# Patient Record
Sex: FEMALE | Race: WHITE | NOT HISPANIC OR LATINO | Employment: UNEMPLOYED | URBAN - METROPOLITAN AREA
[De-identification: names, ages, dates, MRNs, and addresses within clinical notes are randomized per-mention and may not be internally consistent; named-entity substitution may affect disease eponyms.]

---

## 2024-01-16 ENCOUNTER — HOSPITAL ENCOUNTER (EMERGENCY)
Facility: HOSPITAL | Age: 24
Discharge: HOME | End: 2024-01-16
Attending: EMERGENCY MEDICINE | Admitting: EMERGENCY MEDICINE
Payer: COMMERCIAL

## 2024-01-16 VITALS
DIASTOLIC BLOOD PRESSURE: 71 MMHG | HEART RATE: 108 BPM | RESPIRATION RATE: 18 BRPM | SYSTOLIC BLOOD PRESSURE: 128 MMHG | WEIGHT: 120.15 LBS | TEMPERATURE: 98.3 F | HEIGHT: 64 IN | BODY MASS INDEX: 20.51 KG/M2 | OXYGEN SATURATION: 94 %

## 2024-01-16 DIAGNOSIS — H18.822 CORNEAL ABRASION DUE TO CONTACT LENS, LEFT: Primary | ICD-10-CM

## 2024-01-16 PROCEDURE — 99283 EMERGENCY DEPT VISIT LOW MDM: CPT

## 2024-01-16 RX ORDER — TOBRAMYCIN 3 MG/ML
2 SOLUTION/ DROPS OPHTHALMIC ONCE
Status: COMPLETED | OUTPATIENT
Start: 2024-01-16 | End: 2024-01-16

## 2024-01-16 RX ORDER — ERYTHROMYCIN 5 MG/G
1 OINTMENT OPHTHALMIC ONCE
Status: DISCONTINUED | OUTPATIENT
Start: 2024-01-16 | End: 2024-01-16

## 2024-01-16 RX ADMIN — TOBRAMYCIN OPHTHALMIC SOLUTION 2 DROP: 3 SOLUTION/ DROPS OPHTHALMIC at 02:34

## 2024-01-16 NOTE — ED PROVIDER NOTES
"Time: 1:54 AM EST  Date of encounter:  1/16/2024  Independent Historian/Clinical History and Information was obtained by:   Patient    History is limited by: N/A    Chief Complaint: Eye problem      History of Present Illness:  Patient is a 23 y.o. year old female who presents to the emergency department for evaluation of left eye irritation and blurred vision.  Patient states her eye had been irritating her yesterday and she took out her contacts and noticed that 1 corner of it was torn so thought possibly she had scratched her eye.  She has been wearing her glasses for the last 24 hours and the sensation and irritation has continued.  Patient denies any other trauma to her eye.  No purulent discharge just watering and blurred vision is mostly when she looks out towards her ear the painful sensation is more towards her nose on her eye    HPI    Patient Care Team  Primary Care Provider: Provider, No Known    Past Medical History:     No Known Allergies  History reviewed. No pertinent past medical history.  History reviewed. No pertinent surgical history.  History reviewed. No pertinent family history.    Home Medications:  Prior to Admission medications    Not on File        Social History:          Review of Systems:  Review of Systems   Constitutional:  Negative for fever.   HENT:  Negative for sinus pressure, sinus pain, sneezing and sore throat.    Eyes:  Positive for pain (Irritated feeling), redness and visual disturbance (Blurred when she looks a certain way). Negative for photophobia.   Gastrointestinal:  Negative for vomiting.   Skin:  Negative for rash.   Neurological:  Negative for headaches.   All other systems reviewed and are negative.       Physical Exam:  /71 (BP Location: Left arm, Patient Position: Sitting)   Pulse 108   Temp 98.3 °F (36.8 °C) (Oral)   Resp 18   Ht 162.6 cm (64\")   Wt 54.5 kg (120 lb 2.4 oz)   LMP 01/09/2024   SpO2 94%   BMI 20.62 kg/m²     Physical Exam  Vitals and " nursing note reviewed.   Constitutional:       Appearance: Normal appearance.   HENT:      Head: Atraumatic.      Nose: Nose normal.      Mouth/Throat:      Mouth: Mucous membranes are moist.   Eyes:      General: Lids are normal. Lids are everted, no foreign bodies appreciated. Vision grossly intact. Gaze aligned appropriately.         Left eye: No foreign body, discharge or hordeolum.      Extraocular Movements: Extraocular movements intact.      Conjunctiva/sclera:      Right eye: Right conjunctiva is not injected. No chemosis, exudate or hemorrhage.     Left eye: Left conjunctiva is injected. No chemosis, exudate or hemorrhage.     Pupils: Pupils are equal, round, and reactive to light.      Left eye: Corneal abrasion (Tiny at the 3:00 location left cornea) and fluorescein uptake present. Shireen exam negative.     Slit lamp exam:     Right eye: Anterior chamber quiet.      Left eye: Anterior chamber quiet.      Visual Fields: Right eye visual fields normal and left eye visual fields normal.   Pulmonary:      Effort: Pulmonary effort is normal.   Musculoskeletal:         General: Normal range of motion.      Cervical back: Normal range of motion.   Skin:     General: Skin is warm and dry.   Neurological:      General: No focal deficit present.      Mental Status: She is alert.   Psychiatric:         Mood and Affect: Mood normal.         Behavior: Behavior normal.              Medical Decision Making:      Comorbidities that affect care:    None    External Notes reviewed:    None      The following orders were placed and all results were independently analyzed by me:  Orders Placed This Encounter   Procedures    Visual acuity screening       Medications Given in the Emergency Department:  Medications   tobramycin 0.3 % ophthalmic solution 2 drop (2 drops Left Eye Given 1/16/24 0234)        ED Course:         Labs:    Lab Results (last 24 hours)       ** No results found for the last 24 hours. **              Imaging:    No Radiology Exams Resulted Within Past 24 Hours      Differential Diagnosis and Discussion:    Eye Pain/Blurred Vision: Differential diagnosis includes but is not limited to dacryocystitis, hordeolum, chalazion, periorbital cellulitis, cavernous sinus thrombosis, blepharitis, and glaucoma.        MDM  Number of Diagnoses or Management Options  Corneal abrasion due to contact lens, left  Diagnosis management comments: I have explained the patient´s condition, diagnoses and treatment plan based on the information available to me at this time. I have answered questions and addressed any concerns. The patient has a good  understanding of the patient´s diagnosis, condition, and treatment plan as can be expected at this point. The vital signs have been stable. The patient´s condition is stable and appropriate for discharge from the emergency department.      The patient will pursue further outpatient evaluation with the primary care physician or other designated or consulting physician as outlined in the discharge instructions. They are agreeable to this plan of care and follow-up instructions have been explained in detail. The patient has received these instructions in written format and have expressed an understanding of the discharge instructions. The patient is aware that any significant change in condition or worsening of symptoms should prompt an immediate return to this or the closest emergency department or call to 911.       Amount and/or Complexity of Data Reviewed  Tests in the medicine section of CPT®: ordered and reviewed    Risk of Complications, Morbidity, and/or Mortality  Presenting problems: low  Management options: low    Patient Progress  Patient progress: stable         Patient Care Considerations:    NARCOTICS: I considered prescribing opiate pain medication as an outpatient, however patient has minimal pain      Consultants/Shared Management Plan:    None    Social Determinants of  Health:    Patient is independent, reliable, and has access to care.       Disposition and Care Coordination:    Discharged: The patient is suitable and stable for discharge with no need for consideration of observation or admission.    I have explained the patient´s condition, diagnoses and treatment plan based on the information available to me at this time. I have answered questions and addressed any concerns. The patient has a good  understanding of the patient´s diagnosis, condition, and treatment plan as can be expected at this point. The vital signs have been stable. The patient´s condition is stable and appropriate for discharge from the emergency department.      The patient will pursue further outpatient evaluation with the primary care physician or other designated or consulting physician as outlined in the discharge instructions. They are agreeable to this plan of care and follow-up instructions have been explained in detail. The patient has received these instructions in written format and have expressed an understanding of the discharge instructions. The patient is aware that any significant change in condition or worsening of symptoms should prompt an immediate return to this or the closest emergency department or call to 911.  I have explained discharge medications and the need for follow up with the patient/caretakers. This was also printed in the discharge instructions. Patient was discharged with the following medications and follow up:      Medication List      No changes were made to your prescriptions during this visit.      Aldo Solo MD  [address] [place] [address]  [phone]    Schedule an appointment as soon as possible for a visit          Final diagnoses:   Corneal abrasion due to contact lens, left        ED Disposition       ED Disposition   Discharge    Condition   Stable    Comment   --               This medical record created using voice recognition  software.             Neva Dhillno, ESCOBAR  01/16/24 2594

## 2024-01-16 NOTE — DISCHARGE INSTRUCTIONS
Leave your contact lens out until follow-up to ensure proper resolution.    Use tobramycin drops 1 to 2 drops in the left eye every 4 hours while awake for the next week    Tylenol or Motrin as needed for discomfort    Follow-up with ophthalmologist

## 2024-05-09 ENCOUNTER — HOSPITAL ENCOUNTER (EMERGENCY)
Facility: HOSPITAL | Age: 24
Discharge: HOME | End: 2024-05-09
Attending: EMERGENCY MEDICINE
Payer: COMMERCIAL

## 2024-05-09 VITALS
OXYGEN SATURATION: 99 % | DIASTOLIC BLOOD PRESSURE: 64 MMHG | HEART RATE: 90 BPM | RESPIRATION RATE: 16 BRPM | SYSTOLIC BLOOD PRESSURE: 127 MMHG | TEMPERATURE: 98.6 F

## 2024-05-09 DIAGNOSIS — Z3A.01 LESS THAN 8 WEEKS GESTATION OF PREGNANCY: Primary | ICD-10-CM

## 2024-05-09 LAB — HCG INTACT+B SERPL-ACNC: NORMAL MIU/ML

## 2024-05-09 PROCEDURE — 99283 EMERGENCY DEPT VISIT LOW MDM: CPT

## 2024-05-09 PROCEDURE — 36415 COLL VENOUS BLD VENIPUNCTURE: CPT

## 2024-05-09 PROCEDURE — 84702 CHORIONIC GONADOTROPIN TEST: CPT | Performed by: EMERGENCY MEDICINE

## 2024-05-09 NOTE — ED PROVIDER NOTES
Time: 5:11 PM EDT  Date of encounter:  5/9/2024  Independent Historian/Clinical History and Information was obtained by:   Patient    History is limited by: N/A    Chief Complaint: Pregnancy      History of Present Illness:  Patient is a 23 y.o. year old female who presents to the emergency department for evaluation of possible pregnancy.  Patient states she has unpredictable periods and is unsure of when her last menstrual period was.  Patient states she has never been pregnant before but took an at home pregnancy test which was positive.  Patient denies abdominal pain and vaginal bleeding.  Patient states she wants confirmation if she is pregnant.    HPI    Patient Care Team  Primary Care Provider: Provider, No Known    Past Medical History:     No Known Allergies  History reviewed. No pertinent past medical history.  History reviewed. No pertinent surgical history.  History reviewed. No pertinent family history.    Home Medications:  Prior to Admission medications    Medication Sig Start Date End Date Taking? Authorizing Provider   VITAMIN D, CHOLECALCIFEROL, PO Take  by mouth.    Provider, Historical, MD        Social History:   Social History     Tobacco Use    Smoking status: Never    Smokeless tobacco: Never   Substance Use Topics    Alcohol use: Not Currently         Review of Systems:  Review of Systems   Constitutional:  Negative for chills and fever.   HENT:  Negative for congestion, rhinorrhea and sore throat.    Eyes:  Negative for pain and visual disturbance.   Respiratory:  Negative for apnea, cough, chest tightness and shortness of breath.    Cardiovascular:  Negative for chest pain and palpitations.   Gastrointestinal:  Negative for abdominal pain, diarrhea, nausea and vomiting.   Genitourinary:  Negative for difficulty urinating and dysuria.   Musculoskeletal:  Negative for joint swelling and myalgias.   Skin:  Negative for color change.   Neurological:  Negative for seizures and headaches.    Psychiatric/Behavioral: Negative.     All other systems reviewed and are negative.       Physical Exam:  /69 (BP Location: Right arm, Patient Position: Sitting)   Pulse 99   Temp 98.4 °F (36.9 °C) (Oral)   Resp 16   LMP  (LMP Unknown)   SpO2 100%     Physical Exam  Vitals and nursing note reviewed.   Constitutional:       General: She is not in acute distress.     Appearance: Normal appearance. She is not toxic-appearing.   HENT:      Head: Normocephalic and atraumatic.      Jaw: There is normal jaw occlusion.   Eyes:      General: Lids are normal.      Extraocular Movements: Extraocular movements intact.      Conjunctiva/sclera: Conjunctivae normal.      Pupils: Pupils are equal, round, and reactive to light.   Cardiovascular:      Rate and Rhythm: Normal rate and regular rhythm.      Pulses: Normal pulses.      Heart sounds: Normal heart sounds.   Pulmonary:      Effort: Pulmonary effort is normal. No respiratory distress.      Breath sounds: Normal breath sounds. No wheezing or rhonchi.   Abdominal:      General: Abdomen is flat.      Palpations: Abdomen is soft.      Tenderness: There is no abdominal tenderness. There is no guarding or rebound.   Musculoskeletal:         General: Normal range of motion.      Cervical back: Normal range of motion and neck supple.      Right lower leg: No edema.      Left lower leg: No edema.   Skin:     General: Skin is warm and dry.   Neurological:      Mental Status: She is alert and oriented to person, place, and time. Mental status is at baseline.   Psychiatric:         Mood and Affect: Mood normal.                  Procedures:  Procedures      Medical Decision Making:      Comorbidities that affect care:    None    External Notes reviewed:          The following orders were placed and all results were independently analyzed by me:  Orders Placed This Encounter   Procedures    hCG, Quantitative, Pregnancy    Ambulatory Referral to Obstetrics / Gynecology        Medications Given in the Emergency Department:  Medications - No data to display     ED Course:         Labs:    Lab Results (last 24 hours)       Procedure Component Value Units Date/Time    hCG, Quantitative, Pregnancy [[id number]] Collected: 05/09/24 1555    Specimen: Blood Updated: 05/09/24 1648     HCG Quantitative 35,624.00 mIU/mL     Narrative:      HCG Ranges by Gestational Age    Females - non-pregnant premenopausal   </= 1mIU/mL HCG  Females - postmenopausal               </= 7mIU/mL HCG    3 Weeks       5.4   -      72 mIU/mL  4 Weeks      10.2   -     708 mIU/mL  5 Weeks       217   -   8,245 mIU/mL  6 Weeks       152   -  32,177 mIU/mL  7 Weeks     4,059   - 153,767 mIU/mL  8 Weeks    31,366   - 149,094 mIU/mL  9 Weeks    59,109   - 135,901 mIU/mL  10 Weeks   44,186   - 170,409 mIU/mL  12 Weeks   27,107   - 201,615 mIU/mL  14 Weeks   24,302   -  93,646 mIU/mL  15 Weeks   12,540   -  69,747 mIU/mL  16 Weeks    8,904   -  55,332 mIU/mL  17 Weeks    8,240   -  51,793 mIU/mL  18 Weeks    9,649   -  55,271 mIU/mL               Imaging:    No Radiology Exams Resulted Within Past 24 Hours      Differential Diagnosis and Discussion:    Pregnancy    All labs were reviewed and interpreted by me.    MDM     hCG 35,000 indicating positive pregnancy.  Patient denies urinary symptoms.  Patient denies abdominal pain and vaginal bleeding.  Patient states she is asymptomatic.  I will provide an ambulatory referral to OB/GYN for follow-up for prenatal care.  I instructed patient to return to ED if she develops any new symptoms such as abdominal pain, vaginal bleeding, weakness, etc.  Patient states she understands and agrees with plan of care.          Patient Care Considerations:          Consultants/Shared Management Plan:    None    Social Determinants of Health:    Patient is independent, reliable, and has access to care.       Disposition and Care Coordination:    Discharged: The patient is suitable and stable  for discharge with no need for consideration of admission.    I have explained the patient´s condition, diagnoses and treatment plan based on the information available to me at this time. I have answered questions and addressed any concerns. The patient has a good  understanding of the patient´s diagnosis, condition, and treatment plan as can be expected at this point. The vital signs have been stable. The patient´s condition is stable and appropriate for discharge from the emergency department.      The patient will pursue further outpatient evaluation with the primary care physician or other designated or consulting physician as outlined in the discharge instructions. They are agreeable to this plan of care and follow-up instructions have been explained in detail. The patient has received these instructions in written format and has expressed an understanding of the discharge instructions. The patient is aware that any significant change in condition or worsening of symptoms should prompt an immediate return to this or the closest emergency department or call to 911.  I have explained discharge medications and the need for follow up with the patient/caretakers. This was also printed in the discharge instructions. Patient was discharged with the following medications and follow up:      Medication List      No changes were made to your prescriptions during this visit.      Rickey Ruiz MD  [address] [place] [address]  [phone]    Call in 1 day  To schedule follow-up appointment       Final diagnoses:   Less than 8 weeks gestation of pregnancy        ED Disposition       ED Disposition   Discharge    Condition   Stable    Comment   --               This medical record created using voice recognition software.             Jayden Santana PA-C  05/09/24 9788